# Patient Record
(demographics unavailable — no encounter records)

---

## 2025-07-15 NOTE — HISTORY OF PRESENT ILLNESS
[FreeTextEntry1] : Vishnu, 47 year old with a past medical history of Hyperlipidemia (Father - ASCVD  ->  70s; FHx HL in Family), Type IIA HeFH =============== =============== Hyperlipidemia (Father - ASCVD  ->  70s; FHx HL in Family) Type IIA HeFH  - Start Atorva 40  - Discussed Diet & Exercise - Discussed Family Screening - Start Atorva 40  - Check CRP/Lpa  Mn Systolic Murmur (holosystolic) I.VI apical c/f MR, BOWLING - TTE   CC: Heart Issues - Patient reports no chest pain, no localization to the sternum, no radiation to the neck/jaw, no alleviating nor worsening precipitants to CP, no assoc symptoms to CP - Patient notes no associated SOB/Palps/Leg swelling - Reports No associated F/C/N/V/Headaches - Reports Normal Exercise Tolerance - Reports no medication changes - Reports normal mood/quality of life - Reports no associated midnight awakenings from cP - Reports no diet changes - Reports no associated body aches - Reports no recent colds/viruses - Recent labs/imaging reviewed - Relevant Family history reviewed Mother/Father - CV Risk Assessment for 10 Year ACC/AHA Pooled Risk Cohort Equation places this person at < 7.5% Risk of ASCVD

## 2025-07-15 NOTE — PHYSICAL EXAM
[Well Developed] : well developed [Well Nourished] : well nourished [No Acute Distress] : no acute distress [Normal Conjunctiva] : normal conjunctiva [Normal Venous Pressure] : normal venous pressure [No Carotid Bruit] : no carotid bruit [Normal S1, S2] : normal S1, S2 [No Rub] : no rub [No Gallop] : no gallop [Clear Lung Fields] : clear lung fields [Good Air Entry] : good air entry [No Respiratory Distress] : no respiratory distress  [Soft] : abdomen soft [Non Tender] : non-tender [No Masses/organomegaly] : no masses/organomegaly [Normal Bowel Sounds] : normal bowel sounds [Normal Gait] : normal gait [No Edema] : no edema [No Cyanosis] : no cyanosis [No Clubbing] : no clubbing [No Varicosities] : no varicosities [No Rash] : no rash [No Skin Lesions] : no skin lesions [Moves all extremities] : moves all extremities [No Focal Deficits] : no focal deficits [Normal Speech] : normal speech [Alert and Oriented] : alert and oriented [Normal memory] : normal memory [de-identified] : +I/VI Murmur

## 2025-07-15 NOTE — PHYSICAL EXAM
[Well Developed] : well developed [Well Nourished] : well nourished [No Acute Distress] : no acute distress [Normal Conjunctiva] : normal conjunctiva [Normal Venous Pressure] : normal venous pressure [No Carotid Bruit] : no carotid bruit [Normal S1, S2] : normal S1, S2 [No Rub] : no rub [No Gallop] : no gallop [Clear Lung Fields] : clear lung fields [Good Air Entry] : good air entry [No Respiratory Distress] : no respiratory distress  [Soft] : abdomen soft [Non Tender] : non-tender [No Masses/organomegaly] : no masses/organomegaly [Normal Bowel Sounds] : normal bowel sounds [Normal Gait] : normal gait [No Edema] : no edema [No Cyanosis] : no cyanosis [No Clubbing] : no clubbing [No Varicosities] : no varicosities [No Rash] : no rash [No Skin Lesions] : no skin lesions [Moves all extremities] : moves all extremities [No Focal Deficits] : no focal deficits [Normal Speech] : normal speech [Alert and Oriented] : alert and oriented [Normal memory] : normal memory [de-identified] : +I/VI Murmur

## 2025-07-15 NOTE — DISCUSSION/SUMMARY
[FreeTextEntry1] : In summary   Vishnu, 47 year old with a past medical history of Hyperlipidemia (Father - ASCVD  ->  70s; FHx HL in Family), Type IIA HeFH =============== =============== Hyperlipidemia (Father - ASCVD  ->  70s; FHx HL in Family) Type IIA HeFH  - Start Atorva 40  - Discussed Diet & Exercise - Discussed Family Screening - Start Atorva 40  - Check CRP/Lpa  Mn Systolic Murmur (holosystolic) I.VI apical c/f MR, BOWLING - TTE                                                                                                                                                                                                                                               --                                                                                                                                                                                                                                                                                                                                                                                                                                                                                                                                                                                                                                                                                                                                           ----------- 15 minutes was provided for preventive counseling on healthy diet, weight maintenance, CV risk reduction. Specifically, and separate from other cardiovascular evaluation and treatment, we discussed h willingness to focus  on lifestyle changes, particularly dietary; including greater consumption of vegetables, fruits over saturated fats. We discussed appropriate follow up to monitor progress on these areas.     -                                                                                                             -                         -    [EKG obtained to assist in diagnosis and management of assessed problem(s)] : EKG obtained to assist in diagnosis and management of assessed problem(s)